# Patient Record
Sex: FEMALE | Race: OTHER | Employment: OTHER | URBAN - METROPOLITAN AREA
[De-identification: names, ages, dates, MRNs, and addresses within clinical notes are randomized per-mention and may not be internally consistent; named-entity substitution may affect disease eponyms.]

---

## 2017-04-04 ENCOUNTER — TELEPHONE (OUTPATIENT)
Dept: GYNECOLOGIC ONCOLOGY | Age: 62
End: 2017-04-04

## 2017-06-15 ENCOUNTER — HOSPITAL ENCOUNTER (OUTPATIENT)
Age: 62
Setting detail: SPECIMEN
Discharge: HOME | End: 2017-06-15
Payer: MEDICARE

## 2017-06-15 ENCOUNTER — OFFICE VISIT (OUTPATIENT)
Dept: GYNECOLOGIC ONCOLOGY | Age: 62
End: 2017-06-15
Payer: MEDICARE

## 2017-06-15 VITALS
RESPIRATION RATE: 18 BRPM | SYSTOLIC BLOOD PRESSURE: 146 MMHG | OXYGEN SATURATION: 96 % | HEIGHT: 68 IN | DIASTOLIC BLOOD PRESSURE: 92 MMHG | TEMPERATURE: 98.3 F | BODY MASS INDEX: 44.41 KG/M2 | WEIGHT: 293 LBS | HEART RATE: 91 BPM

## 2017-06-15 DIAGNOSIS — Z85.42 HISTORY OF ENDOMETRIAL CANCER: Primary | ICD-10-CM

## 2017-06-15 DIAGNOSIS — R87.629 ABNORMAL VAGINAL PAP SMEAR: ICD-10-CM

## 2017-06-15 DIAGNOSIS — Z12.72 SCREENING FOR VAGINAL CANCER: ICD-10-CM

## 2017-06-15 PROCEDURE — 99213 OFFICE O/P EST LOW 20 MIN: CPT | Performed by: OBSTETRICS & GYNECOLOGY

## 2017-06-15 ASSESSMENT — ENCOUNTER SYMPTOMS
ABDOMINAL DISTENTION: 0
ANAL BLEEDING: 0
TROUBLE SWALLOWING: 0
COUGH: 0
CONSTIPATION: 0
BLOOD IN STOOL: 0
SHORTNESS OF BREATH: 0
ABDOMINAL PAIN: 0

## 2017-06-17 LAB
HPV SAMPLE: NORMAL
HPV SOURCE: NORMAL
HPV, GENOTYPE 16: NOT DETECTED
HPV, GENOTYPE 18: NOT DETECTED
HPV, HIGH RISK OTHER: NOT DETECTED
HPV, INTERPRETATION: NORMAL

## 2017-06-19 ENCOUNTER — TELEPHONE (OUTPATIENT)
Dept: GYNECOLOGIC ONCOLOGY | Age: 62
End: 2017-06-19

## 2017-06-23 LAB — CYTOLOGY REPORT: NORMAL

## 2018-06-18 ENCOUNTER — OFFICE VISIT (OUTPATIENT)
Dept: GYNECOLOGIC ONCOLOGY | Age: 63
End: 2018-06-18
Payer: MEDICARE

## 2018-06-18 ENCOUNTER — HOSPITAL ENCOUNTER (OUTPATIENT)
Age: 63
Setting detail: SPECIMEN
Discharge: HOME | End: 2018-06-18
Payer: MEDICARE

## 2018-06-18 VITALS
DIASTOLIC BLOOD PRESSURE: 88 MMHG | HEART RATE: 62 BPM | SYSTOLIC BLOOD PRESSURE: 136 MMHG | BODY MASS INDEX: 43.4 KG/M2 | HEIGHT: 69 IN | WEIGHT: 293 LBS | TEMPERATURE: 99.2 F | OXYGEN SATURATION: 95 %

## 2018-06-18 DIAGNOSIS — Z08 ENCOUNTER FOR FOLLOW-UP SURVEILLANCE OF ENDOMETRIAL CANCER: ICD-10-CM

## 2018-06-18 DIAGNOSIS — Z12.72 SCREENING FOR VAGINAL CANCER: ICD-10-CM

## 2018-06-18 DIAGNOSIS — B37.2 CANDIDAL SKIN INFECTION: ICD-10-CM

## 2018-06-18 DIAGNOSIS — R87.629 ABNORMAL VAGINAL PAP SMEAR: ICD-10-CM

## 2018-06-18 DIAGNOSIS — Z85.42 ENCOUNTER FOR FOLLOW-UP SURVEILLANCE OF ENDOMETRIAL CANCER: ICD-10-CM

## 2018-06-18 DIAGNOSIS — Z85.42 HISTORY OF ENDOMETRIAL CANCER: Primary | ICD-10-CM

## 2018-06-18 PROCEDURE — 1090F PRES/ABSN URINE INCON ASSESS: CPT | Performed by: NURSE PRACTITIONER

## 2018-06-18 PROCEDURE — 1123F ACP DISCUSS/DSCN MKR DOCD: CPT | Performed by: NURSE PRACTITIONER

## 2018-06-18 PROCEDURE — 4040F PNEUMOC VAC/ADMIN/RCVD: CPT | Performed by: NURSE PRACTITIONER

## 2018-06-18 PROCEDURE — 1036F TOBACCO NON-USER: CPT | Performed by: NURSE PRACTITIONER

## 2018-06-18 PROCEDURE — 99213 OFFICE O/P EST LOW 20 MIN: CPT | Performed by: NURSE PRACTITIONER

## 2018-06-18 PROCEDURE — G8400 PT W/DXA NO RESULTS DOC: HCPCS | Performed by: NURSE PRACTITIONER

## 2018-06-18 PROCEDURE — G8428 CUR MEDS NOT DOCUMENT: HCPCS | Performed by: NURSE PRACTITIONER

## 2018-06-18 PROCEDURE — 3017F COLORECTAL CA SCREEN DOC REV: CPT | Performed by: NURSE PRACTITIONER

## 2018-06-18 PROCEDURE — G8417 CALC BMI ABV UP PARAM F/U: HCPCS | Performed by: NURSE PRACTITIONER

## 2018-06-18 RX ORDER — NYSTATIN 100000 [USP'U]/G
POWDER TOPICAL
Qty: 1 BOTTLE | Refills: 5 | Status: SHIPPED | OUTPATIENT
Start: 2018-06-18

## 2018-06-18 ASSESSMENT — ENCOUNTER SYMPTOMS
TROUBLE SWALLOWING: 0
ANAL BLEEDING: 0
SHORTNESS OF BREATH: 0
CONSTIPATION: 0
ABDOMINAL PAIN: 0
ABDOMINAL DISTENTION: 0
BLOOD IN STOOL: 0
COUGH: 0

## 2018-06-27 LAB — CYTOLOGY REPORT: NORMAL

## 2023-07-03 ENCOUNTER — HOSPITAL ENCOUNTER (EMERGENCY)
Age: 68
Discharge: HOME | End: 2023-07-03
Payer: MEDICARE

## 2023-07-03 VITALS
OXYGEN SATURATION: 97 % | DIASTOLIC BLOOD PRESSURE: 79 MMHG | SYSTOLIC BLOOD PRESSURE: 166 MMHG | HEART RATE: 81 BPM | RESPIRATION RATE: 16 BRPM | TEMPERATURE: 98.78 F

## 2023-07-03 VITALS
HEART RATE: 78 BPM | RESPIRATION RATE: 16 BRPM | OXYGEN SATURATION: 98 % | DIASTOLIC BLOOD PRESSURE: 78 MMHG | SYSTOLIC BLOOD PRESSURE: 162 MMHG | TEMPERATURE: 98.2 F

## 2023-07-03 VITALS — BODY MASS INDEX: 47.3 KG/M2

## 2023-07-03 DIAGNOSIS — Z79.4: ICD-10-CM

## 2023-07-03 DIAGNOSIS — E11.9: ICD-10-CM

## 2023-07-03 DIAGNOSIS — H00.034: Primary | ICD-10-CM

## 2023-07-03 PROCEDURE — 10060 I&D ABSCESS SIMPLE/SINGLE: CPT

## 2023-07-03 PROCEDURE — 99284 EMERGENCY DEPT VISIT MOD MDM: CPT

## 2023-07-03 NOTE — ED_ITS
HPI - Eye Problem    
General    
Chief complaint: Eye Problems    
Stated complaint: EYE    
Time Seen by Provider: 07/03/23 17:13    
Source: patient    
Mode of arrival: Wheelchair    
Limitations: physical limitation    
History of Present Illness    
HPI Narrative:     
this patient here for swelling on the left side of her upper eyelid. Several   
weeks ago she had a cosmetic procedure because of eyelid drooping. She received   
a postop visit approximately ten days ago and the incision was coming along   
nicely. However she noticed on the most lateral aspect of the incision today   
that there is a low not she thinks it's a little pustule. She is a diabetic. She  
is not currently on any antibiotics. The eye itself is no conjunctivae we'll   
injection and there is no drainage or discharge. She has no fever shakes chills   
or any indication of sepsis.    
Related Data    
                                Home Medications    
    
    
    
 Medication  Instructions  Recorded  Confirmed    
     
insulin regular human 100 unit/mL 1 sliding scale dose 07/03/23     
    
injection solution (Humulin R       
    
Regular U-100 Insulin)       
    
    
    
                                    Allergies    
    
    
    
Allergy/AdvReac Type Severity Reaction Status Date / Time    
     
Iodinated Contrast Media AdvReac Severe  Verified 07/03/23 17:15    
    
    
    
    
PFSH    
PFS    
Social History    
Smoking status:  Never smoker     
    
    
    
Exam    
Narrative    
Exam Narrative:     
awake alert vital signs are noted she is afebrile. Problem focused examination   
as noted below    
    
I examination shows no conjunctivitis extracting muscles and normal pupillary   
light response is normal. At the lateral aspect of her upper eyelid there is a   
small 2-3 mm fluctuant pustule. I feel she'll benefit from needle aspiration of   
this.    
Constitutional    
    
                               Vital Signs - 24 hr    
    
    
    
 07/03/23    
17:07    
     
Temperature 98.8 F    
     
Pulse Rate [Monitor] 81    
     
Respiratory Rate 16    
     
Blood Pressure [Left Arm] 166/79 H    
     
Pulse Oximetry 97    
     
Oxygen Delivery Method Room Air    
    
    
    
    
    
Course    
Vital Signs    
Vital signs:     
    
                                   Vital Signs    
    
    
    
Temperature  98.8 F   07/03/23 17:07    
     
Pulse Rate  81   07/03/23 17:07    
     
Respiratory Rate  16   07/03/23 17:07    
     
Blood Pressure  166/79 H  07/03/23 17:07    
     
Pulse Oximetry  97   07/03/23 17:07    
     
Oxygen Delivery Method  Room Air  07/03/23 17:07    
    
    
                                            
    
    
    
Temperature  98.8 F   07/03/23 17:07    
     
Pulse Rate  81   07/03/23 17:07    
     
Respiratory Rate  16   07/03/23 17:07    
     
Blood Pressure  166/79 H  07/03/23 17:07    
     
Pulse Oximetry  97   07/03/23 17:07    
     
Oxygen Delivery Method  Room Air  07/03/23 17:07    
    
    
    
    
    
MDM - Eye Problem    
MDM Narrative    
Medical decision making narrative:     
after lidocaine one percent anesthesia with epinephrine to the pustule area a   
#18-gauge needle was used to make a tiny pinpoint incision.immediate return of   
purulent material was recovered there was a small amount as clinically   
suspected. She tolerated procedure well    
    
Discharge Plan    
Discharge    
Chief Complaint: Eye Problems    
    
Clinical Impression:    
 Abscess of face    
    
    
Patient Disposition: Home, Self-Care    
    
Time of Disposition Decision: 17:47    
    
Prescriptions / Home Meds:    
No Action    
  Humulin R Regular U-100 Insuln 100 unit/mL solution     
   1 sliding scale dose       
    
Instructions:  Abscess (ED)    
    
Additional Instructions:    
cephalexin for five days, warm compresses    
    
Stand Alone Forms:  Portal Instructions    
    
Referrals:    
Physician,Non-Staff, MD [Primary Care Provider] - 1 week

## 2023-10-02 ENCOUNTER — HOSPITAL ENCOUNTER (OUTPATIENT)
Dept: HOSPITAL 101 - LAB | Age: 68
Discharge: HOME | End: 2023-10-02
Payer: MEDICARE

## 2023-10-02 DIAGNOSIS — Z12.4: Primary | ICD-10-CM

## 2023-10-02 PROCEDURE — G0145 SCR C/V CYTO,THINLAYER,RESCR: HCPCS

## 2024-10-07 ENCOUNTER — HOSPITAL ENCOUNTER (OUTPATIENT)
Dept: HOSPITAL 101 - LAB | Age: 69
Discharge: HOME | End: 2024-10-07
Payer: MEDICARE

## 2024-10-07 DIAGNOSIS — Z01.419: Primary | ICD-10-CM

## 2024-10-07 PROCEDURE — 88175 CYTOPATH C/V AUTO FLUID REDO: CPT
